# Patient Record
Sex: FEMALE | Race: WHITE
[De-identification: names, ages, dates, MRNs, and addresses within clinical notes are randomized per-mention and may not be internally consistent; named-entity substitution may affect disease eponyms.]

---

## 2017-01-30 ENCOUNTER — HOSPITAL ENCOUNTER (EMERGENCY)
Dept: HOSPITAL 45 - C.EDB | Age: 22
Discharge: HOME | End: 2017-01-30
Payer: COMMERCIAL

## 2017-01-30 VITALS
HEIGHT: 64.02 IN | WEIGHT: 119.49 LBS | BODY MASS INDEX: 20.4 KG/M2 | BODY MASS INDEX: 20.4 KG/M2 | HEIGHT: 64.02 IN | WEIGHT: 119.49 LBS

## 2017-01-30 VITALS — HEART RATE: 82 BPM | SYSTOLIC BLOOD PRESSURE: 104 MMHG | OXYGEN SATURATION: 99 % | DIASTOLIC BLOOD PRESSURE: 66 MMHG

## 2017-01-30 VITALS — TEMPERATURE: 98.24 F

## 2017-01-30 DIAGNOSIS — Z84.1: ICD-10-CM

## 2017-01-30 DIAGNOSIS — Z83.79: ICD-10-CM

## 2017-01-30 DIAGNOSIS — Z83.3: ICD-10-CM

## 2017-01-30 DIAGNOSIS — Z3A.14: ICD-10-CM

## 2017-01-30 DIAGNOSIS — O99.512: ICD-10-CM

## 2017-01-30 DIAGNOSIS — Z79.899: ICD-10-CM

## 2017-01-30 DIAGNOSIS — Z87.891: ICD-10-CM

## 2017-01-30 DIAGNOSIS — Z82.49: ICD-10-CM

## 2017-01-30 DIAGNOSIS — J01.90: Primary | ICD-10-CM

## 2017-01-30 NOTE — EMERGENCY ROOM VISIT NOTE
History


First contact with patient:  23:30


Chief Complaint:  SINUS CONGESTION/PRESSURE


Stated Complaint:  COLD SYMPTOMS


Nursing Triage Summary:  


Cough and congestion





History of Present Illness


The patient is a 21 year old female who presents to the Emergency Room with 

complaints of getting and sinus congestion, pressure, postnasal drip and 

coughing from the postnasal drip.  The patient reports that her symptoms 

started 4 days ago, and are progressively worsening.  She denies any fevers or 

chills.  She does report a history of chronic sinusitis as a child.  The 

patient is currently 14 weeks pregnant.  She did call her OB/GYN who suggested 

that she could take Sudafed for her symptoms.  She is concerned that Sudafed 

could be harmful for her pregnancy.  She has also been using a humidifier which 

has helped significantly.  She does report a mild headache rated a 2 out of 10.





Review of Systems


10 system review was performed and was negative except for pertinent positives 

and negatives as indicated in history of present illness





Past Medical/Surgical History


Medical Problems:


(1) Stomach problems


Surgical Problems:


(1) H/O arthroscopic knee surgery








Family History





Cancer


FH: diabetes mellitus


Gallbladder disease


Heart disease


Hypertension


Kidney disease





Social History


Smoking Status:  Former Smoker


Alcohol Use:  none


Marital Status:  


Occupation Status:  employed





Current/Historical Medications


Scheduled


Amoxicillin (Amoxil), 500 MG PO TID


Cholecalciferol (Vitamin D3), 5,000 UNITS PO DAILY


Multivit/Min/Iron/Fol Ac/Pren (Prenatal Vitamin), 1 TAB PO DAILY





Allergies


Coded Allergies:  


     Asparagus (Verified  Allergy, Severe, RASH, 11/13/16)


     Gluten (Verified  Adverse Reaction, Intermediate, GI UPSET, 11/13/16)





Physical Exam


Vital Signs











  Date Time  Temp Pulse Resp B/P Pulse Ox O2 Delivery O2 Flow Rate FiO2


 


1/30/17 23:53  82 16 104/66 99   


 


1/30/17 23:46     100 Room Air  


 


1/30/17 23:27 36.8 86 20 113/70 94 Room Air  











Physical Exam


CONSTITUTIONAL:  Healthy and well nourished.  Alert and oriented X 3 with 

positive affect.  Patient does not appear in any acute distress.


HEENT:  Normocephalic, atraumatic.  Pupils equal, round and reactive.  Patient 

does not have any significant tenderness to palpation or percussion of the 

frontal or maxillary sinuses.  Clear rhinorrhea is noted.  TMs are bulging 

without air-fluid levels or purulent effusion.


OROPHARYNX: Mild postnasal drip is noted.  No tonsillar hypertrophy.


NECK:  Full active range of motion without discomfort.


LYMPHATICS: No cervical chain adenopathy.


RESPIRATORY:  Clear to auscultation bilaterally with no wheezing, crackles, 

rhonchi or stridor.


CARDIOVASCULAR:  Regular rate and rhythm with no murmurs, rubs or gallops.


INTEGUMENTARY:  No rash or other significant dermatologic conditions noted.


NEUROLOGIC:  No focal neurologic deficits noted.





Medical Decision & Procedures


ED Course


Patient history and physical exam were performed.  Nurse's notes were reviewed.

  Vital signs were reviewed.  The patient is afebrile.  O2 saturation was 94% 

on room air in triage.  I did recheck O2 saturation in her room, and was 98%.  

Clinical exam is suggestive of sinus etiology, probably acute viral sinusitis.  

I did suggest trying Benadryl for symptomatic relief.  She can also try saline 

nasal rinses as well as.  I will defer the choice of Sudafed to the patient's OB

/GYN.  In case the patient's symptoms progressively worsen, she was provided a 

prescription for amoxicillin 500 mg 3 times a day 10 days.  She was instructed 

to follow-up with her family doctor if symptoms progressively worsened.  The 

patient was happy with plan of care, voiced understanding of all discharge 

instructions, and denied any significant discomfort at the time of discharge.





Medical Decision








Impression





 Primary Impression:  


 Acute sinusitis





Departure Information


Dispostion


Home / Self-Care





Prescriptions





Amoxicillin (AMOXIL) 500 Mg Cap


500 MG PO TID for 10 Days, #30 CAP


   Prov: Randal Howe PA         1/30/17





Forms


HOME CARE DOCUMENTATION FORM,                                                 

               IMPORTANT VISIT INFORMATION





Patient Instructions


Sinusitis Acute, My [facility]





Additional Instructions





Try taking Benadryl for symptomatic relief.


Also suggest trying nasal saline rinses.


Tylenol 1000 mg every 6-8 hours as needed for headache or pain.


Symptoms progressively worsened, take your amoxicillin antibiotics as 

prescribed.


Follow-up with your family doctor as needed for any worsening symptoms.





Problem Qualifiers








 Primary Impression:  


 Acute sinusitis


 Sinusitis location:  unspecified location  Recurrence:  non-recurrent  

Qualified Codes:  J01.90 - Acute sinusitis, unspecified

## 2017-05-22 ENCOUNTER — HOSPITAL ENCOUNTER (OUTPATIENT)
Dept: HOSPITAL 45 - C.OPB | Age: 22
Discharge: HOME | End: 2017-05-22
Attending: OBSTETRICS & GYNECOLOGY
Payer: COMMERCIAL

## 2017-05-22 DIAGNOSIS — O26.893: Primary | ICD-10-CM

## 2017-05-22 DIAGNOSIS — Z3A.30: ICD-10-CM

## 2017-05-22 DIAGNOSIS — R10.9: ICD-10-CM

## 2017-05-22 NOTE — PROGRESS NOTE
Progress Note


Date of Service


May 22, 2017.





Progress Note


Outpatient Note


22 F  at 30.1 weeks seen in L&D today for abdominal pain that is not 

accompanied by any leakage of fluid or any bleeding.  Patient states she has no 

dysuria or frequency.  Has had no constipation and  moving her bowels OK.  No 

problems in the pregnancy to date.    T Cat 1.  Cervix closed and thick and 

posterior.  Will d/c home.  Encourage PO hydration.

## 2017-07-30 ENCOUNTER — HOSPITAL ENCOUNTER (INPATIENT)
Dept: HOSPITAL 45 - C.LD | Age: 22
LOS: 7 days | Discharge: HOME | End: 2017-08-06
Attending: OBSTETRICS & GYNECOLOGY | Admitting: OBSTETRICS & GYNECOLOGY
Payer: COMMERCIAL

## 2017-07-30 VITALS
WEIGHT: 119.01 LBS | HEIGHT: 64.02 IN | BODY MASS INDEX: 20.32 KG/M2 | BODY MASS INDEX: 20.32 KG/M2 | HEIGHT: 64.02 IN | WEIGHT: 119.01 LBS

## 2017-07-30 DIAGNOSIS — O48.0: Primary | ICD-10-CM

## 2017-07-30 DIAGNOSIS — Z3A.40: ICD-10-CM

## 2017-08-03 LAB
EOSINOPHIL NFR BLD AUTO: 191 K/UL (ref 130–400)
HCT VFR BLD CALC: 35.5 % (ref 37–47)
MCH RBC QN AUTO: 32.5 PG (ref 25–34)
MCHC RBC AUTO-ENTMCNC: 34.6 G/DL (ref 32–36)
MCV RBC AUTO: 93.9 FL (ref 80–100)
PMV BLD AUTO: 10.1 FL (ref 7.4–10.4)
RBC # BLD AUTO: 3.78 M/UL (ref 4.2–5.4)
WBC # BLD AUTO: 10.9 K/UL (ref 4.8–10.8)

## 2017-08-03 RX ADMIN — SODIUM CHLORIDE, SODIUM LACTATE, POTASSIUM CHLORIDE, AND CALCIUM CHLORIDE SCH MLS/HR: 600; 310; 30; 20 INJECTION, SOLUTION INTRAVENOUS at 20:14

## 2017-08-03 NOTE — HISTORY & PHYSICAL EXAMINATION
DATE OF ADMISSION:  2017

 

CHIEF COMPLAINT:  Postdates pregnancy, here for labor induction.

 

HISTORY OF PRESENT ILLNESS:  This is a 22-year-old , due date 2017

making her 40 weeks and 4 days pregnant.  The patient is here at [facility] for labor induction for postdates.  On arrival to

labor and delivery, she has no shortness of breath, no chills, no fever, no

bloody show, no rupture of membranes.  Fetal heart rate is category 1.

 

PRENATAL COURSE:  Unremarkable.

 

PRENATAL LABS:  Blood type A negative, antibody negative, rubella immune, GBS

negative.

 

PAST MEDICAL HISTORY:  None.

 

PAST SURGICAL HISTORY:  History of knee surgery.

 

SOCIAL HISTORY:  The patient denies tobacco, drug or alcohol use.

 

FAMILY HISTORY:  Noncontributory.

 

OBSTETRIC AND GYNECOLOGIC HISTORY:  This is patient's first pregnancy.

 

PHYSICAL EXAMINATION:

GENERAL:  Well-developed, well-nourished white female in no acute distress.

HEART:  S1, S2, regular rhythm and rate.

LUNGS:  Clear to auscultation bilaterally.

ABDOMEN:  Gravid.

PELVIC:  The patient was 2 cm dilated.

EXTREMITIES:  No cyanosis, clubbing or edema.

 

ASSESSMENT AND PLAN:  A 22-year-old  1, para 0, at 40 weeks and 4 days

here for postdates induction.  Plan is to admit patient and start induction

and anticipate vaginal delivery.

## 2017-08-04 VITALS — TEMPERATURE: 98.42 F | DIASTOLIC BLOOD PRESSURE: 68 MMHG | SYSTOLIC BLOOD PRESSURE: 101 MMHG | HEART RATE: 72 BPM

## 2017-08-04 PROCEDURE — 0UQG7ZZ REPAIR VAGINA, VIA NATURAL OR ARTIFICIAL OPENING: ICD-10-PCS | Performed by: OBSTETRICS & GYNECOLOGY

## 2017-08-04 PROCEDURE — 3E0P7GC INTRODUCTION OF OTHER THERAPEUTIC SUBSTANCE INTO FEMALE REPRODUCTIVE, VIA NATURAL OR ARTIFICIAL OPENING: ICD-10-PCS | Performed by: OBSTETRICS & GYNECOLOGY

## 2017-08-04 RX ADMIN — SODIUM CHLORIDE, SODIUM LACTATE, POTASSIUM CHLORIDE, AND CALCIUM CHLORIDE SCH MLS/HR: 600; 310; 30; 20 INJECTION, SOLUTION INTRAVENOUS at 15:20

## 2017-08-04 RX ADMIN — ROPIVACAINE HYDROCHLORIDE PRN ML: 2 INJECTION, SOLUTION EPIDURAL; INFILTRATION at 19:11

## 2017-08-04 RX ADMIN — SODIUM CHLORIDE, SODIUM LACTATE, POTASSIUM CHLORIDE, AND CALCIUM CHLORIDE SCH MLS/HR: 600; 310; 30; 20 INJECTION, SOLUTION INTRAVENOUS at 10:41

## 2017-08-04 RX ADMIN — Medication PRN MG: at 20:38

## 2017-08-04 RX ADMIN — SODIUM CHLORIDE, SODIUM LACTATE, POTASSIUM CHLORIDE, AND CALCIUM CHLORIDE SCH MLS/HR: 600; 310; 30; 20 INJECTION, SOLUTION INTRAVENOUS at 02:02

## 2017-08-04 RX ADMIN — SODIUM CHLORIDE, SODIUM LACTATE, POTASSIUM CHLORIDE, AND CALCIUM CHLORIDE SCH MLS/HR: 600; 310; 30; 20 INJECTION, SOLUTION INTRAVENOUS at 06:47

## 2017-08-04 RX ADMIN — ROPIVACAINE HYDROCHLORIDE PRN ML: 2 INJECTION, SOLUTION EPIDURAL; INFILTRATION at 18:59

## 2017-08-04 NOTE — ANESTHESIA PROCEDURE NOTE
Anesthesia Epidural Removal Nt


Date & Time


Aug 4, 2017 at 22:16





Vital Signs


Pain Intensity:  4.0





Notes


Mental Status:  alert / awake / arousable, participated in evaluation


Nausea / Vomiting:  adequately controlled


Pain:  adequately controlled


Airway Patency, RR, SpO2:  stable & adequate


BP & HR:  stable & adequate


Hydration State:  stable & adequate


Neuraxial Anesthesia:  was administered, sensory block is resolving


Anesthetic Complications:  no major complications apparent, pt satisfied with 

anesthetic care


Epidural:  removed without complications, with tip intact

## 2017-08-05 VITALS — DIASTOLIC BLOOD PRESSURE: 72 MMHG | TEMPERATURE: 98.24 F | SYSTOLIC BLOOD PRESSURE: 112 MMHG | HEART RATE: 101 BPM

## 2017-08-05 VITALS
HEART RATE: 86 BPM | SYSTOLIC BLOOD PRESSURE: 122 MMHG | TEMPERATURE: 97.88 F | OXYGEN SATURATION: 100 % | DIASTOLIC BLOOD PRESSURE: 79 MMHG

## 2017-08-05 VITALS — DIASTOLIC BLOOD PRESSURE: 72 MMHG | HEART RATE: 96 BPM | SYSTOLIC BLOOD PRESSURE: 112 MMHG | TEMPERATURE: 98.42 F

## 2017-08-05 VITALS — SYSTOLIC BLOOD PRESSURE: 109 MMHG | HEART RATE: 89 BPM | DIASTOLIC BLOOD PRESSURE: 71 MMHG | TEMPERATURE: 98.06 F

## 2017-08-05 VITALS — TEMPERATURE: 98.78 F | HEART RATE: 98 BPM | SYSTOLIC BLOOD PRESSURE: 114 MMHG | DIASTOLIC BLOOD PRESSURE: 75 MMHG

## 2017-08-05 LAB — HCT VFR BLD CALC: 33.2 % (ref 37–47)

## 2017-08-05 RX ADMIN — Medication PRN MG: at 06:00

## 2017-08-05 RX ADMIN — DOCUSATE SODIUM SCH MG: 100 CAPSULE, LIQUID FILLED ORAL at 09:16

## 2017-08-05 RX ADMIN — Medication PRN MG: at 19:34

## 2017-08-05 RX ADMIN — Medication SCH TAB: at 09:15

## 2017-08-05 RX ADMIN — Medication PRN MG: at 13:29

## 2017-08-05 RX ADMIN — DOCUSATE SODIUM SCH MG: 100 CAPSULE, LIQUID FILLED ORAL at 19:34

## 2017-08-05 RX ADMIN — FERROUS SULFATE TAB EC 325 MG (65 MG FE EQUIVALENT) SCH MG: 325 (65 FE) TABLET DELAYED RESPONSE at 09:16

## 2017-08-05 NOTE — OB/GYN PROGRESS NOTE
OB/GYN Progress Note


Date of Service


Aug 5, 2017.





Subjective


conversation w/ patient, physical exam


Ambulation:  ambulating normally


Voiding:  no voiding problems


Passing Gas:  Yes


Diet Tolerance:  Regular Diet


Lochia:  Small


Feeding Type:  Breast Feeding


Pain:  1/10


Notes:


Doing well, no concerns.





Objective


Vital Signs











  Date Time  Temp Pulse Resp B/P (MAP) Pulse Ox O2 Delivery O2 Flow Rate FiO2


 


8/5/17 08:15      Room Air  


 


8/5/17 08:15 37.1 98 18 114/75 (88)  Room Air  


 


8/5/17 03:55 36.7 89 18 109/71 (84)  Room Air  


 


8/4/17 23:00      Room Air  


 


8/4/17 23:00 36.9 72 18 101/68 (79)  Room Air  











Physical Exam


General Appearance:  WELL-APPEARING


Respiratory/Chest:  chest non-tender, lungs clear


Cardiovascular:  regular rate, rhythm


Abdomen:  normal bowel sounds, soft


Fundus:  Firm


Extremities:  normal range of motion, non-tender, no calf tenderness





Laboratory Results





Last 24 Hours








Test


  8/5/17


06:08


 


Hemoglobin 11.3 g/dL 


 


Hematocrit 33.2 % 











Assessment and Plan


Post-Partum


Day Number:  1


Continue Routine Care:


-Continue routine postpartum care


-Anticipate d/c home tomorrow

## 2017-08-05 NOTE — DELIVERY SUMMARY
DATE OF OPERATION:  08/04/2017

 

TIME OF DELIVERY OF BABY:  1946 p.m.

 

TIME OF DELIVERY OF PLACENTA:  2001 p.m.

 

DETAILS OF DELIVERY:  The patient was found to be fully dilated and desired

to push.  She pushed for about 2 hours and delivered the head over an intact

perineum.  Anterior shoulder was delivered with minimal traction and then

baby was delivered without difficulty and handed off to the mother, where mouth 
and nose were suctioned.  Cord

was clamped x2 and cut at 1 minute and it was a 3-vessel cord.  Cord blood

was obtained.  Baby was recovered on the maternal chest.

Then the mother's vagina and perineum were checked for lacerations.  There was 
a 1 cm first-degree

vaginal laceration at the left of lower vaginal wall.  It was repaired with 3-0

Vicryl in a running locked fashion.  Good hemostasis was achieved.  There was

a superficial first degree laceration on anterior vaginal wall lower the 
bladder. 

Campbell catheter was inserted into the bladder during repair and removed.

It was repaired with 3-0 Vicryl on SH needle. Good hemostasis was achieved. 

  Placenta was

found to be in the vagina and delivered spontaneously as intact and complete.

 Uterus was explored and found to be empty.  Lower segment was cleared of all

clots and debris.  Fundus was firm.  EBL was 200.  Mom and baby tolerated the

procedure well.  Sponge, lap, needle, instrument count was correct x2.  Baby

was a viable male infant.  Apgars 8/9 and Weight is 3536 gr.  No complications 
happened and

I was present during whole procedure.

 

 

I attest to the content of the Intraoperative Record and any orders documented 
therein. Any exceptions are noted below.

 

 

 

MTDD

## 2017-08-06 VITALS
SYSTOLIC BLOOD PRESSURE: 119 MMHG | DIASTOLIC BLOOD PRESSURE: 75 MMHG | TEMPERATURE: 98.24 F | OXYGEN SATURATION: 98 % | HEART RATE: 83 BPM

## 2017-08-06 VITALS — HEART RATE: 81 BPM | DIASTOLIC BLOOD PRESSURE: 65 MMHG | TEMPERATURE: 98.42 F

## 2017-08-06 VITALS — HEART RATE: 81 BPM | TEMPERATURE: 98.42 F | DIASTOLIC BLOOD PRESSURE: 65 MMHG | SYSTOLIC BLOOD PRESSURE: 103 MMHG

## 2017-08-06 RX ADMIN — DOCUSATE SODIUM SCH MG: 100 CAPSULE, LIQUID FILLED ORAL at 09:35

## 2017-08-06 RX ADMIN — Medication PRN MG: at 01:15

## 2017-08-06 RX ADMIN — FERROUS SULFATE TAB EC 325 MG (65 MG FE EQUIVALENT) SCH MG: 325 (65 FE) TABLET DELAYED RESPONSE at 09:35

## 2017-08-06 RX ADMIN — Medication PRN MG: at 09:43

## 2017-08-06 RX ADMIN — Medication SCH TAB: at 09:35

## 2017-08-06 NOTE — OB/GYN PROGRESS NOTE
OB/GYN Progress Note


Date of Service


Aug 6, 2017.





Subjective


conversation w/ patient


Ambulation:  ambulating normally


Voiding:  no voiding problems


Passing Gas:  Yes


Diet Tolerance:  Regular Diet


Lochia:  Small


Feeding Type:  Breast Feeding


Pain:  1/10


Notes:


Doing well, no concerns. Would like to go home today.





Review of Systems


Constitutional:  No fever, No chills, No sweats, No weight loss, No weakness, 

No fatigue, No problem reported


Respiratory:  No cough, No sputum, No wheezing, No shortness of breath, No 

dyspnea on exertion, No dyspnea at rest, No hemoptysis, No problem reported


Cardiac:  No chest pain, No orthopnea, No PND, No edema, No claudication, No 

palpitations, No problem reported


Abdomen:  No pain, No nausea, No vomiting, No diarrhea, No constipation, No GI 

bleeding, No problem reported


Female :  No see HPI, No dysuria, No urinary frequency, No hematuria, No 

incontinence, No abnormal vaginal bleeding, No vaginal discharge, No problem 

reported





Objective


Vital Signs











  Date Time  Temp Pulse Resp B/P (MAP) Pulse Ox O2 Delivery O2 Flow Rate FiO2


 


8/6/17 00:00     98 Room Air  


 


8/6/17 00:00 36.8 83 20 119/75 (90) 98 Room Air  


 


8/5/17 19:45 36.6 86 20 122/79 (93) 100 Room Air  


 


8/5/17 15:45      Room Air  


 


8/5/17 15:45 36.9 96 18 112/72 (85)  Room Air  


 


8/5/17 12:00 36.8 101 18 112/72 (85)  Room Air  


 


8/5/17 08:15      Room Air  


 


8/5/17 08:15 37.1 98 18 114/75 (88)  Room Air  











Assessment and Plan


Post-Partum


Day Number:  2


Continue Routine Care:


-D/C home today


-F/U in 6 weeks.

## 2017-08-06 NOTE — DISCHARGE INSTRUCTIONS
Discharge Instructions


Date of Service


Aug 6, 2017.





Admission


Reason for Admission:  Induction





Discharge


Discharge Diagnosis / Problem:  Vaginal Delivery





Discharge Goals


Goal(s):  Routine recovery after delivery





Medications


Continue Dispensed Medications:  supercream, dermaplast, tucks, lansinoh





Activity Recommendations


Activity Limitations:  per Instructions/Follow-up section





.





Instructions / Follow-Up


Instructions / Follow-Up





ACTIVITY RECOMMENDATIONS:





* Gradual return to full activity over the next 2-3 weeks.


* No lifting - nothing heavier than baby over the next 2-3 weeks.


* Do not engage in vigorous exercise, sexual activity or sports until cleared 

by 


   your physician.


* Do not drive or operate any motorized equipment until cleared by your 

physician.


* You may shower/bathe daily.








BREAST CARE:





If you are not breast feeding:





*  Wear a supportive bra 24 hours a day for one to two weeks.


*  Avoid stimulating your breasts and nipples as much as possible during the


    first few weeks after delivery.


*  When taking a shower, have the warm water hit your back, not breasts.


*  When your breasts feel full, apply ice packs.  Usually three to four times 


    a day helps ease the discomfort.


*  Take a mild pain medication (Tylenol/Motrin) when you are uncomfortable.





If breast feeding:





*  Use breast milk to lubricate nipples.  Lansinoh cream may be used for sore 

nipples. 


    You do not need to remove cream prior to breast feeding.  If using a 

different 


    brand of cream, check the label for directions regarding removal of cream 

prior 


    to nursing.


*  Wear a supportive bra.


*  If having problems with breasts or breast feeding, call a lactation 

consultant or 


    your health care provider.








EPISIOTOMY CARE:





After delivery, if you have an episiotomy (stitches), the following steps will 


ease discomfort and aid healing.





*   For the first 24 hours after delivery, place ice packs next to your 

episiotomy 


    to help reduce swelling.


*  After the first 24 hour-period, sitz baths, either portable or in the tub, 

are 


    suggested.  A shower with a shower arm sprayed over the episiotomy may 


    be comforting.


*  [place] care should be done after each voiding and bowel movement.  Squirt 


    warm water from a plastic bottle over the perineum (region of the body 


    between the anus and urinary opening) and pat dry.


*  Use Dermoplast to ease discomfort.  Shake container.  Spray directly over


    the episiotomy.  


*  Place a Tucks on a clean sanitary pad next to your episiotomy.








OVER THE COUNTER MEDICATION:





*  For discomfort or pain, you may use Acetaminophen (Tylenol), Ibuprofen (Advil

), or 


    Naproxen (Aleve) following the package directions. 


*  For constipation you may use Colace following the package directions.








SPECIAL CARE INSTRUCTIONS:





When you are discharged from the hospital, it is important for you to follow 


the instructions listed below:





*  During the first week at home, you should be able to care for yourself and


    your baby.  In addition, the usual light household activities are 

encouraged.





*  Limit your activities to the way you feel.  Do not try to clean the house or 


    move furniture.  Be sensible.





*  If you actively engage in sports and have done so up until the time of your


   delivery, you may resume these activities as soon as you feel able.  This may


   take up to one month or even longer.  Use good judgment.





*  Continue to take your prenatal vitamins for at least six weeks after the 

birth


    of your baby.





*  Your diet need not be limited unless you were on a special diet before your 


    delivery.  Breast-feeding mothers need around 2500 calories per day and at


    least 64-80 ounces of fluid per day (8 to 10 glasses).





*  You should eat foods from the four major food groups.  Crash diets or fad 

diets


    are to be avoided.  Eating lean meats, fresh fruits and vegetables, low-fat 


    dairy products, high fiber foods and a regular exercise program, will help 

you 


    get back to your pre-pregnancy weight without putting your health at risk.





*  Constipation is sometimes a problem after delivery.  Take a mild laxative as 


    needed.  If breast feeding, Milk of Magnesia is acceptable to use. You may 


    use a suppository or Fleets enema if no episiotomy.





*  A daily shower or tub bath is suggested.  Be sure to thoroughly and gently 


    dry the perineum.





*  A bloody vaginal discharge will usually continue until around four weeks post


    partum.  A small amount of bleeding may continue for as long as six weeks.  


    Vaginal discharge changes from the bright red bleeding after delivery to 

pink 


    then brownish and finally yellowish-pink before becoming white and 

disappearing.





*  Bleeding may increase with activity.  Your first period may come in 4-8 

weeks.


    If you are breast feeding, your period may be delayed even longer.





*  [place] (sex) can begin whenever both you and your partner feel 

comfortable


    and do not have any form of genital infection.  It is recommended that you 

wait


    until after your return postpartum appointment and discuss with your 

physician.


    If you have questions, please talk to your health care practitioner.  A 

condom 


    should be used to prevent infection and pregnancy.





*  Foreplay, gentle intercourse and lubrication is very important the first 

several 


    times to prevent pain.  A water-based lubricant such as K-Y jelly or 

Astroglide 


    may be used.





*  Tampons may be used six weeks after delivery.





*  Douching should be avoided for 6 weeks after delivery.





*  If you have RH negative blood and your baby is RH positive, you will receive 


    RHOGAM by injection prior to discharge.  The nurse will give you a card to 


    keep with you that has the date and place that you received RHOGAM after 


    delivery.





*  During your prenatal care, you had a Rubella screen done to check for the 


    presence of rubella antibodies in your blood.  If your test was negative, 

you


    will receive a Rubella vaccine prior to discharge.  This vaccine may cause 

a 


    fever, soreness at the injection site and flu-like symptoms.  If these 

symptoms


    persist, notify your health care practitioner.   Pregnancy is not advised 

for 


    three months after a Rubella vaccine.  There is a higher chance of having a 


    baby with birth defects if conceived within three months of getting the 

vaccine.





*  If you were discharged 24 hours from delivery or before 48 hours: Visiting 

nurses 


    will come to your home 48 hours after discharge to assess you and your 

baby.  


    The visiting nurse will meet with you while you are in the hospital to 

arrange a 


    time and get directions to your home.





*  Verbalizes understanding of car seat law as reviewed with patient nursing.





*  Car Seat hand-out given and reviewed with patient by nursing.





*  Shaken baby information reviewed with patient by nursing.


 





Call you doctor if:





*  Heavy bleeding (saturating several pads an hour) or passing clots the size 

of 


    your fist.


*  A fever >101 degrees F (38.3 degrees C) on two occasions four hours apart 


    and/or chills.


*  Unusual pain in the pelvic or vaginal areas.


*  "Baby Blues" lasting longer than two weeks.





If you have any questions or concerns, call your health care practitioner 


at [phone].








FOLLOW-UP VISIT:





*  Please call the office at [phone] to schedule a 6 week postpartum


    examination.  It is important you keep this appointment.  


*  It is important for you to make arrangements for either yearly or twice 


    yearly check-ups thereafter.





Current Hospital Diet


Patient's current hospital diet: N/A, Regular OB Diet





Discharge Diet


Recommended Diet:  Regular OB Diet





Pending Studies


Studies pending at discharge:  no





Medical Emergencies








.


Who to Call and When:





Medical Emergencies:  If at any time you feel your situation is an emergency, 

please call 911 immediately.





.





Non-Emergent Contact


Non-Emergency issues call your:  Primary Care Provider, Gynecologist





.


.








"Provider Documentation" section prepared by Farooq Marsh.








.





VTE Core Measure


Inpt VTE Proph given/why not?:  Treatment not indicated

## 2018-03-27 ENCOUNTER — HOSPITAL ENCOUNTER (EMERGENCY)
Dept: HOSPITAL 45 - C.EDB | Age: 23
Discharge: HOME | End: 2018-03-27
Payer: SELF-PAY

## 2018-03-27 VITALS — DIASTOLIC BLOOD PRESSURE: 76 MMHG | HEART RATE: 75 BPM | OXYGEN SATURATION: 98 % | SYSTOLIC BLOOD PRESSURE: 113 MMHG

## 2018-03-27 VITALS
HEIGHT: 64.02 IN | WEIGHT: 117.95 LBS | BODY MASS INDEX: 20.14 KG/M2 | BODY MASS INDEX: 20.14 KG/M2 | WEIGHT: 117.95 LBS | HEIGHT: 64.02 IN

## 2018-03-27 VITALS — TEMPERATURE: 98.06 F

## 2018-03-27 DIAGNOSIS — S02.5XXA: Primary | ICD-10-CM
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
## 2018-03-27 NOTE — EMERGENCY ROOM VISIT NOTE
History


First contact with patient:  19:12


Chief Complaint:  DENTAL PAIN


Stated Complaint:  TOOTH ISSUES


Nursing Triage Summary:  


Pt states has a right upper wisdom tooth that "has a hole in it and I think it 


exploded and is infected. It broke two days ago."





History of Present Illness


The patient is a 22 year old female who presents to the Emergency Room via 

private vehicle accompanied by male and young child "dental pain".  The patient 

states that with complaints of she has been experiencing cold is with her right 

superior posterior wisdom tooth for the past 2 years.  She states that it broke 

2 days ago, now she has had significant pain is been increasing and radiates to 

her face.  She rates the overall pain as a 7/10.  She has been trying ibuprofen 

with minimal relief.  She expresses concerns that this is causing her to have 

decreased eating habits because of the pain and worries that it may 

subsequently affect the child what she is breast-feeding.  There are no fevers, 

chills, drainage is from the region.





Review of Systems


A complete 10-point Review of Systems was discussed with the patient, with 

pertinent positives and negatives listed in the History of Present Illness. All 

remaining Review of Systems questions can be considered negative unless 

otherwise specified.





Past Medical/Surgical History


Medical Problems:


(1) Abdominal pain affecting pregnancy


(2) Pregnancy


(3) Stomach problems


Surgical Problems:


(1) H/O arthroscopic knee surgery








Family History





Cancer


FH: diabetes mellitus


Gallbladder disease


Heart disease


Hypertension


Kidney disease





Social History


Smoking Status:  Never Smoker


Alcohol Use:  none


Marital Status:  


Occupation Status:  employed





Current/Historical Medications


Scheduled


Amoxicillin (Amoxicillin), 500 MG PO TID


Cholecalciferol (Vitamin D3), 5,000 UNITS PO DAILY


Multivit/Min/Iron/Fol Ac/Pren (Prenatal Vitamin), 1 TAB PO DAILY





Physical Exam


Vital Signs











  Date Time  Temp Pulse Resp B/P (MAP) Pulse Ox O2 Delivery O2 Flow Rate FiO2


 


3/27/18 20:12  75 16 113/76 98   


 


3/27/18 19:09 36.7 94 18 148/89 98 Room Air  











Physical Exam


VITAL SIGNS - Vital signs and nursing notes were reviewed. Stable. 


GENERAL - 22-year-old female appearing her stated age who is in no acute 

distress. Communicates well with provider and answers questions appropriately.


SKIN - Without rashes. No meningeal rashes or petechial rashes.


HEAD - NC/AT.


EYES - PERRL with EOMI bilaterally. Sclera anicteric. 


EARS - No deformities of external structures noted on gross examination 

bilaterally.  External auditory canals without discharge or otorrhea. Tympanic 

membranes pearly gray without retraction or bulging. No fluid or purulent 

material visualized behind the TM. Handle of malleus, umbo, cone of light, pars 

tensa/flaccid all easily visualized.


NOSE - Midline and without cyanosis. No epistaxis or purulent drainage noted. 


MOUTH/OROPHARYNX - Without perioral cyanosis. Buccal mucosa pink and moist and 

without leukoplakia. Fractured superior, posterior dentition noted.  No drainage

, evidence of abscess or overt infection.


NECK - Neck with FROM. Supple to palpation. No lymphadenopathy noted. No nuchal 

rigidity.





Medical Decision & Procedures


Medications Administered











 Medications


  (Trade)  Dose


 Ordered  Sig/Beatriz


 Route  Start Time


 Stop Time Status Last Admin


Dose Admin


 


 Ibuprofen


  (Motrin Tab)  600 mg  STK-MED ONCE


 .ROUTE  3/27/18 20:07


 3/27/18 20:08 DC 3/27/18 20:07


600 MG


 


 Acetaminophen


  (Tylenol Tab)  1,000 mg  STK-MED ONCE


 PO  3/27/18 20:07


 3/27/18 20:08 DC 3/27/18 20:07


1,000 MG











Medical Decision


Patient was seen and evaluated as above in room D6.  She presents to us today 

with right superior posterior molar/wisdom tooth pain with fracture of the 

tooth.  No drainage.  The gums are not erythematous.  No evidence of Lukas 

angina.  There is no evidence of infection at this time from a visual 

inspection standpoint.  She is nontoxic in appearance.  Review was performed of 

nursing notes and vital signs. After obtaining a thorough history and physical 

examination decision was made to treat her with amoxicillin in the event that 

you could be a small infection developing that is subclinical, and providing 

her information to follow with a local dentist.  She is to return with 

worsening.  I do believe amoxicillin as a safe choice given that she is breast-

feeding, and she is also to alternate Tylenol and ibuprofen being observant of 

the dosages.  She is to return with worsening.  The patient was educated upon 

management, had questions answered prior to discharge, and was discharged home 

in good condition.





In the evaluation and treatment of this patient, the following differential 

diagnoses were considered: Periapical Abscess, Osteonecrosis of the Jaw, Dental 

Fracture, Dental Caries, Lukas's Angina, Vincent's Angina, Facial Cellulitis.





Impression





 Primary Impression:  


 Pain, dental





Departure Information


Dispostion


Home / Self-Care





Condition


GOOD





Prescriptions





Amoxicillin (Amoxicillin) 500 Mg Cap


500 MG PO TID for 10 Days, #30 TABS


   Prov: Emerson Viet ABREU PA-C         3/27/18





Referrals


Steve Medina M.D.([facility]) (PCP)





Patient Instructions


[facility]





Additional Instructions





You have been treated in the Emergency Department for Dental Pain. 








You were prescribed Amoxicillin to be taken every 8 hours. This is an 

antibiotic. All antibiotics have the potential to cause diarrhea. Stop this 

medication and contact a medical provider if you were to develop any 

significant adverse side effects including: wheezing, shortness of breath, 

passing out, vomiting, or a diffuse rash. Always take antibiotics as directed 

and COMPLETE the ENTIRE course regardless of the improvement of your symptoms.





For pain control, you can use the following over-the-counter medicines (if >11 yo):





- Regular strength (325mg/tab) Tylenol (acetaminophen) 2 tabs every 4-6 hours 

as needed. Do not exceed 12 tablets in a 24 hour period. Avoid taking more than 

3 grams (3000 mg) of Tylenol per day. This includes any other sources of 

acetaminophen you may take on a regular basis.





- Regular strength (200 mg/tab) Advil (ibuprofen) 1-2 tabs every 4-6 hours as 

needed. Do not exceed a dose of 3200 mg per day.





Refrain from smoking cigarettes or using chewing tobacco until you have been 

evaluated by your dentist. Keeping beverages lukewarm and consuming soft foods 

can decrease your pain. Warm compresses over the affected area may offer some 

relief.





You MUST seek evaluation of your dental pain by a dentist following your visit 

to the Emergency Department. The Emergency Department is not capable of 

treating dental issues long-term. You should call your dentist as soon as 

possible to make an appointment for evaluation of your dental pain.





Dr. Felipe Jaramillo, DMD


[phone]


[address], [address], [place].





Use the wax over the tooth for comfort.





Return to the emergency department if you develop the following symptoms 

despite treatment course outlined above: fever, intractable pain, increased 

redness, swelling, or purulent discharge.